# Patient Record
Sex: MALE | Race: WHITE
[De-identification: names, ages, dates, MRNs, and addresses within clinical notes are randomized per-mention and may not be internally consistent; named-entity substitution may affect disease eponyms.]

---

## 2019-02-10 ENCOUNTER — HOSPITAL ENCOUNTER (EMERGENCY)
Dept: HOSPITAL 80 - FED | Age: 24
Discharge: HOME | End: 2019-02-10
Payer: COMMERCIAL

## 2019-02-10 VITALS — DIASTOLIC BLOOD PRESSURE: 77 MMHG | SYSTOLIC BLOOD PRESSURE: 128 MMHG

## 2019-02-10 DIAGNOSIS — S61.218A: Primary | ICD-10-CM
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
## 2019-02-10 NOTE — EDPHY
H & P


Stated Complaint: laceration right index from work, knife


Time Seen by Provider: 02/10/19 12:38


HPI/ROS: 


HPI:  This is a 23-year-old male who presents with





Chief Complaint:  Laceration of right index finger from work.  





Location:  Right index finger pad


Quality:  Laceration


Duration:  Yesterday at 4:00 p.m.


Signs and Symptoms: + bleeding, no radiation, no numbness, no weakness, no 

tingling, no incontinence, no decreased range of motion, no swelling, no pain, 

no fever


Timing:  Acute


Severity:  Mild


Context:  Patient is right-hand dominant, presents with accidentally cutting 

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  He reports that he washed the area with soap and 

water and applied a Band-Aid.  This morning after he took a shower the area 

started to rebleed. Denies radiation, weakness, decreased range of motion.  

Tetanus is current.


Modifying Factors:  See above





Comment: 








ROS:  A comprehensive 10 system review of systems is otherwise negative aside 

from elements mentioned in the history of present illness. 








MEDICAL/SURGICAL/SOCIAL HISTORY: 


Medical history:  Generally healthy.  Does not take any regular medications.


Surgical history:  Denies


Social history:  Employed.  Never smoked.








CONSTITUTIONAL:  Well-developed, well-nourished, young adult white male, awake 

and alert, no obvious distress


HEENT: Atraumatic and normocephalic.


NECK: supple, no midline tenderness


Cardiovascular: Normal S1/S2, regular rate, regular rhythm, without murmur rub 

or gallop.


PULMONARY/CHEST:  Symmetrical and nontender. no crepitus. Clear to auscultation 

bilaterally. Good air movement. No accessory muscle usage.


ABDOMEN:  Soft, nondistended, nontender.


EXTREMITIES:  2/2 pulses, strength 5/5, right index finger pad shows 

superficial skin avulsion measuring approximately 2 cm x 2 cm with no active 

bleeding.  No nail involvement.  DIP/PIP/MCP flexion/extension intact with good 

light touch sensation. no deformities, no clubbing, no cyanosis or edema.


NEUROLOGICAL: no focal neuro deficits.  GCS 15.  Light touch sensation intact.


SKIN: Warm and dry, no erythema. no rash.  Good capillary refill.   





Source: Patient


Exam Limitations: No limitations





- Personal History


Current Tetanus/Diphtheria Vaccine: Yes


Current Tetanus Diphtheria and Acellular Pertussis (TDAP): Yes





- Medical/Surgical History


Hx Asthma: No


Hx Chronic Respiratory Disease: No


Hx Diabetes: No


Hx Cardiac Disease: No


Hx Renal Disease: No


Hx Cirrhosis: No


Hx Alcoholism: No


Hx HIV/AIDS: No


Hx Splenectomy or Spleen Trauma: No


Other PMH: none





- Social History


Smoking Status: Never smoked


Constitutional: 


 Initial Vital Signs











Temperature (C)  36.6 C   02/10/19 12:32


 


Heart Rate  65   02/10/19 12:32


 


Respiratory Rate  16   02/10/19 12:32


 


Blood Pressure  128/77 H  02/10/19 12:32


 


O2 Sat (%)  98   02/10/19 12:32








 











O2 Delivery Mode               Room Air

















Medical Decision Making


ED Course/Re-evaluation: 


Tetanus is up-to-date.


No indication for sutures to be placed.


No signs of infection.


Area cleaned thoroughly; Surgifoam, Xeroform and tube gauze dressing applied 

with good hemostasis.


Verbal and written wound care instructions provided








No signs of neurovascular compromise/tenting of skin/compartment syndrome/

extremities and joints examined above and below area of concern and are 

neurovascularly intact/tenosynovitis. 








This patient was seen under the supervision of my secondary supervising 

physician.  I evaluated care for this patient with attending.  Discussed this 

patient with Dr. Christopher.  





Differential Diagnosis: 


Differential diagnosis includes but is not limited to skin avulsion, laceration

, nerve injury, tendon injury, foreign body, cellulitis.








Departure





- Departure


Disposition: Home, Routine, Self-Care


Clinical Impression: 


Avulsion of skin of finger without complication


Qualifiers:


 Encounter type: initial encounter Qualified Code(s): S61.209A - Unspecified 

open wound of unspecified finger without damage to nail, initial encounter





Condition: Good


Instructions:  Skin Avulsion (ED), Laceration Without Closure (ED)


Additional Instructions: 


Keep the dressing dry and in place for 48 hours.


After 48 hours, you may remove the dressing; wash the site daily with mild soap 

and water; then pat dry. 


Apply topical antibiotic ointment and keep covered until fully healed.


Take Tylenol 650 mg every 4 hours and/or Ibuprofen 600 mg every 8 hours with 

food as needed for pain. 








Return to the ER immediately if you experience redness, red streaks, have fevers

/chills, flu like symptoms, limited range of motion, or any other symptoms that 

concern you.





Referrals: 


[facility],. [Clinic] - As per Instructions


Stand Alone Forms:  Work Excuse